# Patient Record
Sex: FEMALE | Race: BLACK OR AFRICAN AMERICAN | URBAN - METROPOLITAN AREA
[De-identification: names, ages, dates, MRNs, and addresses within clinical notes are randomized per-mention and may not be internally consistent; named-entity substitution may affect disease eponyms.]

---

## 2021-09-13 ENCOUNTER — OFFICE VISIT (OUTPATIENT)
Dept: URGENT CARE | Facility: URGENT CARE | Age: 46
End: 2021-09-13
Payer: COMMERCIAL

## 2021-09-13 ENCOUNTER — ANCILLARY PROCEDURE (OUTPATIENT)
Dept: GENERAL RADIOLOGY | Facility: CLINIC | Age: 46
End: 2021-09-13
Attending: PHYSICIAN ASSISTANT
Payer: COMMERCIAL

## 2021-09-13 VITALS
OXYGEN SATURATION: 99 % | SYSTOLIC BLOOD PRESSURE: 155 MMHG | DIASTOLIC BLOOD PRESSURE: 92 MMHG | TEMPERATURE: 98.5 F | RESPIRATION RATE: 18 BRPM | HEART RATE: 73 BPM

## 2021-09-13 DIAGNOSIS — R05.3 PERSISTENT COUGH: Primary | ICD-10-CM

## 2021-09-13 DIAGNOSIS — I31.4 PERICARDIAL EFFUSION WITH CARDIAC TAMPONADE: ICD-10-CM

## 2021-09-13 DIAGNOSIS — I31.39 PERICARDIAL EFFUSION WITH CARDIAC TAMPONADE: ICD-10-CM

## 2021-09-13 PROCEDURE — 71046 X-RAY EXAM CHEST 2 VIEWS: CPT | Performed by: RADIOLOGY

## 2021-09-13 PROCEDURE — 99203 OFFICE O/P NEW LOW 30 MIN: CPT | Performed by: PHYSICIAN ASSISTANT

## 2021-09-13 RX ORDER — AMLODIPINE BESYLATE 10 MG/1
TABLET ORAL
COMMUNITY
Start: 2021-08-22

## 2021-09-13 RX ORDER — LOSARTAN POTASSIUM AND HYDROCHLOROTHIAZIDE 12.5; 5 MG/1; MG/1
TABLET ORAL
COMMUNITY
Start: 2021-08-22

## 2021-09-13 RX ORDER — COLCHICINE 0.6 MG/1
0.6 TABLET ORAL DAILY
COMMUNITY
Start: 2021-08-16

## 2021-09-13 ASSESSMENT — ENCOUNTER SYMPTOMS
PALPITATIONS: 0
CARDIOVASCULAR NEGATIVE: 1
SINUS PRESSURE: 0
SHORTNESS OF BREATH: 0
COUGH: 1
CHILLS: 0
FATIGUE: 0
SORE THROAT: 0
WHEEZING: 0
FEVER: 0
GASTROINTESTINAL NEGATIVE: 1
RHINORRHEA: 0
SINUS PAIN: 0
CONSTITUTIONAL NEGATIVE: 1

## 2021-09-13 ASSESSMENT — PAIN SCALES - GENERAL: PAINLEVEL: NO PAIN (0)

## 2021-09-13 NOTE — PROGRESS NOTES
Giuseppe Hendricks is a 46 year old who presents for the following health issues   HPI   Acute Illness  Acute illness concerns:   Onset/Duration: 5months or so.  Developed pericarditis with effusion, cardiac tamponde, 6months ago after getting the Moderna vaccine and subsequently underwent pericardial window.  Since then she has had this persistent cough.  Symptoms:  Fever: no  Chills/Sweats: no  Headache (location?): no  Sinus Pressure: no  Conjunctivitis:  no  Ear Pain: no  Rhinorrhea: no  Congestion: no  Sore Throat: no  Cough: YES-productive of yellow sputum.  No chest pain, SOB, palpitations, orthopnea, PND or peripheral edema.   Wheeze: no  Decreased Appetite: no  Nausea: no  Vomiting: no  Diarrhea: no  Dysuria/Freq.: no  Dysuria or Hematuria: no  Fatigue/Achiness: no  Sick/Strep Exposure: no  Therapies tried and outcome: OTC cough meds with minimal relief  There is no problem list on file for this patient.    Current Outpatient Medications   Medication     amLODIPine (NORVASC) 10 MG tablet     colchicine (COLCYRS) 0.6 MG tablet     losartan-hydrochlorothiazide (HYZAAR) 50-12.5 MG tablet     No current facility-administered medications for this visit.      No Known Allergies    Review of Systems   Constitutional: Negative.  Negative for chills, fatigue and fever.   HENT: Negative for congestion, ear discharge, ear pain, hearing loss, rhinorrhea, sinus pressure, sinus pain and sore throat.    Respiratory: Positive for cough. Negative for shortness of breath and wheezing.    Cardiovascular: Negative.  Negative for chest pain, palpitations and peripheral edema.   Gastrointestinal: Negative.    All other systems reviewed and are negative.           Objective    BP (!) 155/92   Pulse 73   Temp 98.5  F (36.9  C) (Oral)   Resp 18   SpO2 99%   There is no height or weight on file to calculate BMI.  Physical Exam  Vitals and nursing note reviewed.   Constitutional:       General: She is not in acute distress.      Appearance: Normal appearance. She is well-developed. She is obese. She is not ill-appearing.   HENT:      Head: Normocephalic and atraumatic.      Comments: TMs are intact without any erythema or bulging bilaterally.  Airway is patent.     Nose: Nose normal.      Mouth/Throat:      Lips: Pink.      Mouth: Mucous membranes are moist.      Pharynx: Oropharynx is clear. Uvula midline. No pharyngeal swelling, oropharyngeal exudate or posterior oropharyngeal erythema.      Tonsils: No tonsillar exudate.   Eyes:      General: No scleral icterus.     Extraocular Movements: Extraocular movements intact.      Conjunctiva/sclera: Conjunctivae normal.      Pupils: Pupils are equal, round, and reactive to light.   Neck:      Thyroid: No thyromegaly.   Cardiovascular:      Rate and Rhythm: Normal rate and regular rhythm.      Pulses: Normal pulses.      Heart sounds: Normal heart sounds, S1 normal and S2 normal. No murmur heard.   No friction rub. No gallop.    Pulmonary:      Effort: Pulmonary effort is normal. No accessory muscle usage, respiratory distress or retractions.      Breath sounds: Normal breath sounds and air entry. No stridor. No decreased breath sounds, wheezing, rhonchi or rales.   Musculoskeletal:      Cervical back: Normal range of motion and neck supple.   Lymphadenopathy:      Cervical: No cervical adenopathy.   Skin:     General: Skin is warm and dry.      Nails: There is no clubbing.   Neurological:      Mental Status: She is alert and oriented to person, place, and time.   Psychiatric:         Mood and Affect: Mood normal.         Behavior: Behavior normal.         Thought Content: Thought content normal.         Judgment: Judgment normal.       Results for orders placed or performed in visit on 09/13/21 (from the past 24 hour(s))   XR Chest 2 Views    Narrative    CHEST TWO VIEWS 9/13/2021 4:07 PM     HISTORY: Persistent cough.    COMPARISON: None.       Impression    IMPRESSION:  There are no acute  infiltrates. The cardiac silhouette is  not enlarged. Pulmonary vasculature is unremarkable.     TIFFANI CAMPOVERDE MD         SYSTEM ID:  [id number]         Assessment/Plan:  Persistent cough:  Along with hx of pericardial effusion, s/p cardiac window, and cardiomegaly on CXR.  H&P is concerning for recurrent pericardial effusion vs viral/bacterial vs covid.  Recommend further evaluation and management in the ER.  Will most likely need further workup with labs and/or imaging.  Patient has declined transportation via ambulance and will have family drive her/him.  Understands risks and benefits of ambulance transfer and s/he has declined.  Call 911 if worsening symptoms.  S/he plans to go to [facility].  S/he left in stable condition with AVS in hand.  F/u with PCP after ER visit.   -     XR Chest 2 Views    Pericardial effusion with cardiac tamponade        Nicole Gardner PA-C